# Patient Record
Sex: FEMALE | Race: BLACK OR AFRICAN AMERICAN | Employment: FULL TIME | URBAN - METROPOLITAN AREA
[De-identification: names, ages, dates, MRNs, and addresses within clinical notes are randomized per-mention and may not be internally consistent; named-entity substitution may affect disease eponyms.]

---

## 2020-01-05 ENCOUNTER — TRANSFERRED RECORDS (OUTPATIENT)
Dept: HEALTH INFORMATION MANAGEMENT | Facility: CLINIC | Age: 35
End: 2020-01-05

## 2020-08-26 ENCOUNTER — PRE VISIT (OUTPATIENT)
Dept: NEUROLOGY | Facility: CLINIC | Age: 35
End: 2020-08-26

## 2020-08-26 NOTE — TELEPHONE ENCOUNTER
FUTURE VISIT INFORMATION      FUTURE VISIT INFORMATION:    Date: 8/31/2020    Time: 810am    Location: [facility]  REFERRAL INFORMATION:    Referring provider:  Self     Referring providers clinic:      Reason for visit/diagnosis  Headaches     RECORDS REQUESTED FROM:       Clinic name Comments Records Status Imaging Status   [facility] CTA Head 1/5/2020 Requested  Requested                                    -8/31/2020-[facility] records scanned to Media tab, 2nd request for CTA Head 1/5/2020 faxed to [facility] Radiology-MR @ 621am

## 2020-08-30 ASSESSMENT — ENCOUNTER SYMPTOMS
DIZZINESS: 0
TROUBLE SWALLOWING: 1
PANIC: 0
DEPRESSION: 1
LEG PAIN: 0
DISTURBANCES IN COORDINATION: 0
HOARSE VOICE: 0
SINUS PAIN: 1
INSOMNIA: 1
JOINT SWELLING: 0
DECREASED CONCENTRATION: 1
ORTHOPNEA: 0
WEAKNESS: 0
STIFFNESS: 0
SMELL DISTURBANCE: 0
SEIZURES: 0
LIGHT-HEADEDNESS: 0
MEMORY LOSS: 0
MUSCLE WEAKNESS: 0
NERVOUS/ANXIOUS: 1
TREMORS: 0
ARTHRALGIAS: 0
SORE THROAT: 1
HYPERTENSION: 0
SINUS CONGESTION: 0
SYNCOPE: 0
NECK PAIN: 1
NECK MASS: 0
BACK PAIN: 1
NUMBNESS: 0
SLEEP DISTURBANCES DUE TO BREATHING: 0
TINGLING: 0
SPEECH CHANGE: 0
HEADACHES: 1
PARALYSIS: 0
LOSS OF CONSCIOUSNESS: 0
TASTE DISTURBANCE: 0
MYALGIAS: 0
PALPITATIONS: 0
EXERCISE INTOLERANCE: 0
HYPOTENSION: 0
MUSCLE CRAMPS: 0

## 2020-08-30 ASSESSMENT — HEADACHE IMPACT TEST (HIT 6)
WHEN YOU HAVE HEADACHES HOW OFTEN IS THE PAIN SEVERE: VERY OFTEN
WHEN YOU HAVE A HEADACHE HOW OFTEN DO YOU WISH YOU COULD LIE DOWN: VERY OFTEN
HIT6 TOTAL SCORE: 68
HOW OFTEN HAVE YOU FELT TOO TIRED TO WORK BECAUSE OF YOUR HEADACHES: VERY OFTEN
HOW OFTEN DID HEADACHS LIMIT CONCENTRATION ON WORK OR DAILY ACTIVITY: VERY OFTEN
HOW OFTEN HAVE YOU FELT FED UP OR IRRITATED BECAUSE OF YOUR HEADACHES: ALWAYS
HOW OFTEN DO HEADACHES LIMIT YOUR DAILY ACTIVITIES: VERY OFTEN

## 2020-08-31 ENCOUNTER — OFFICE VISIT (OUTPATIENT)
Dept: NEUROLOGY | Facility: CLINIC | Age: 35
End: 2020-08-31
Payer: COMMERCIAL

## 2020-08-31 VITALS
HEART RATE: 89 BPM | RESPIRATION RATE: 16 BRPM | OXYGEN SATURATION: 98 % | SYSTOLIC BLOOD PRESSURE: 119 MMHG | DIASTOLIC BLOOD PRESSURE: 65 MMHG

## 2020-08-31 DIAGNOSIS — Z82.49 FAMILY HISTORY OF CEREBRAL ANEURYSM: ICD-10-CM

## 2020-08-31 DIAGNOSIS — R51.9 HEADACHE DISORDER: Primary | ICD-10-CM

## 2020-08-31 DIAGNOSIS — R20.8 DYSESTHESIA: ICD-10-CM

## 2020-08-31 RX ORDER — FLUCONAZOLE 150 MG/1
TABLET ORAL
COMMUNITY
Start: 2020-02-12

## 2020-08-31 RX ORDER — DIMENHYDRINATE 50 MG
TABLET ORAL
COMMUNITY
Start: 2013-03-01

## 2020-08-31 RX ORDER — TRAMADOL HYDROCHLORIDE 50 MG/1
50 TABLET ORAL
COMMUNITY
Start: 2018-03-02

## 2020-08-31 RX ORDER — CETIRIZINE HYDROCHLORIDE 10 MG/1
TABLET ORAL
COMMUNITY

## 2020-08-31 RX ORDER — ONDANSETRON 4 MG/1
TABLET, FILM COATED ORAL
COMMUNITY
Start: 2018-03-01

## 2020-08-31 RX ORDER — FLUTICASONE PROPIONATE 50 MCG
SPRAY, SUSPENSION (ML) NASAL
COMMUNITY

## 2020-08-31 ASSESSMENT — PAIN SCALES - GENERAL: PAINLEVEL: MODERATE PAIN (4)

## 2020-08-31 NOTE — PATIENT INSTRUCTIONS
Plan:  Brain MRI with and without contrast  Brain MRA -family history of ruptured cerebral aneurysm  Follow up via video to review images and to discuss headache treatment.
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
## 2020-08-31 NOTE — LETTER
Date:September 17, 2020      Patient was self referred, no letter generated. Do not send.        [facility] Information
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
## 2020-09-22 ENCOUNTER — ANCILLARY PROCEDURE (OUTPATIENT)
Dept: MRI IMAGING | Facility: CLINIC | Age: 35
End: 2020-09-22
Attending: NURSE PRACTITIONER
Payer: COMMERCIAL

## 2020-09-22 DIAGNOSIS — R20.8 DYSESTHESIA: ICD-10-CM

## 2020-09-22 DIAGNOSIS — R51.9 HEADACHE DISORDER: ICD-10-CM

## 2020-09-22 DIAGNOSIS — Z82.49 FAMILY HISTORY OF CEREBRAL ANEURYSM: ICD-10-CM

## 2020-09-22 ASSESSMENT — HEADACHE IMPACT TEST (HIT 6)
WHEN YOU HAVE A HEADACHE HOW OFTEN DO YOU WISH YOU COULD LIE DOWN: VERY OFTEN
HIT6 TOTAL SCORE: 66
HOW OFTEN HAVE YOU FELT TOO TIRED TO WORK BECAUSE OF YOUR HEADACHES: VERY OFTEN
HOW OFTEN DID HEADACHS LIMIT CONCENTRATION ON WORK OR DAILY ACTIVITY: VERY OFTEN
HOW OFTEN HAVE YOU FELT FED UP OR IRRITATED BECAUSE OF YOUR HEADACHES: VERY OFTEN
WHEN YOU HAVE HEADACHES HOW OFTEN IS THE PAIN SEVERE: VERY OFTEN
HOW OFTEN DO HEADACHES LIMIT YOUR DAILY ACTIVITIES: VERY OFTEN

## 2020-09-23 ENCOUNTER — OFFICE VISIT (OUTPATIENT)
Dept: NEUROLOGY | Facility: CLINIC | Age: 35
End: 2020-09-23
Payer: COMMERCIAL

## 2020-09-23 VITALS
SYSTOLIC BLOOD PRESSURE: 132 MMHG | HEART RATE: 85 BPM | OXYGEN SATURATION: 96 % | DIASTOLIC BLOOD PRESSURE: 94 MMHG | RESPIRATION RATE: 16 BRPM

## 2020-09-23 DIAGNOSIS — G43.111 INTRACTABLE MIGRAINE WITH AURA WITH STATUS MIGRAINOSUS: Primary | ICD-10-CM

## 2020-09-23 DIAGNOSIS — R20.8 DYSESTHESIA OF SCALP: ICD-10-CM

## 2020-09-23 DIAGNOSIS — Z82.49 FAMILY HISTORY OF CEREBRAL ANEURYSM: ICD-10-CM

## 2020-09-23 ASSESSMENT — PAIN SCALES - GENERAL: PAINLEVEL: NO PAIN (0)

## 2020-09-23 NOTE — RESULT ENCOUNTER NOTE
Filiberto Vargas,   Your recent brain MRI results reviewed and no concerning findings at this time. Follow up as discussed.   Stay safe,   Irma

## 2020-09-23 NOTE — NURSING NOTE
Chief Complaint   Patient presents with     Follow Up     [facility] RETURN HEADACHE        Terrence Rose

## 2020-09-23 NOTE — PROGRESS NOTES
Re: Sam Schofield      MRN# [id number]  YOB: 1985  Date of Visit:2020    OUTPATIENT NEUROLOGY VISIT NOTE    Reason for Visit:  Headache follow-up    Interval History:  Sam Schofield is a 35-year-old female presents to the [facility] headache clinic today for headache follow-up  Initial neurology/headache clinic visit on 2020, see note for headache history details.  Today patient reports headaches are still frequent. Mother has migraine headaches.  Grandmother  from cerebral aneurysm rupture.  Brain MRI and MRA without IV contrast reviewed and normal brain MRI and head MRA demonstrated no defined aneurysmal stenosis of the major intracranial arteries.  Images were reviewed with the patient.  Patient reports burning sensation on the top of her head happened twice and have not felt like that since late August.  Patient reports that she is concerned abo burning sensation.   Headache frequency feels like every day and wakes up with a migraine headache and feels like every day. Reports that caffeine beverage help but gives anxious feeling. Reports that headaches can intensify and reports that when headache starts to throb that she cannot walk -takes excedrin. Reports that she tried not to take Excedrin.   Associated -light and sound sensitivity, nausea, no vomiting. Headache worse with movements/activity. Pain can be on either side.     Migraine headaches and possible overlap with occipital neuralgia which may explain the burning sensation.   Headache treatment Plan discussed with the patient:  Headache log for frequency, duration, severity, acute analgesics use  Stay hydrated  Stay active   Keep sleep routine   Caffeine intake -limit use to no more than 1-2 cups per day   Frequent away from screen breaks  Headache prevention  Vitamin B2 200-400 mg daily OTC -may causes GI symptoms, urine discoloration  Acupuncture  Would recommend A trial of Migraine headache prevention-a trial of topiramate  25 mg at bedtime for one week, then 50 mg at bedtime for one week, then take 75 mg (3 tabs) at bedtime. Side effects-teratogenic effect if pregnant and need to use reliable birth control and back up birth control, may decrease efficacy of birth control pills, stay hydrated and drink at least 10+glasses of water to decrease risk of kidney stones, may cause tingling in the hands and feet, taste changes, glaucoma, nausea, weight stable or loss, mood changes, depression  Alternatively we could try other treatment options with commonly used medications such as propranolol or amitriptyline in small doses with a gradual increase per tolerance and response   Cefaly device -dual action -headache prevention and acute headache treatment   Acute migraine headache treatment -a trial of sumatriptan as needed for acute migraine treatment. Limit use to no more than 9 days per month.   May consider antinausea medication-ondasetron or prochlorperazine or promethazine.   Recommended a complete eye exam   Follow up in 1-2 months or sooner if needed     Burning sensation we could try an occipital nerve block if no relief with attempted migraine headache preventive treatment.     Headache possible etiology is with the patient of the patient's questions were answered from the best of my knowledge.  Reassurance given especially with headache normal neurological exam and normal images results.  Patient would like to wait with any recommended treatment and think about suggested plan.  She will get back to us once she decides what she would like to do.  Patient is more interested in nonpharmacological approaches which I respect and options given.  And options were given to the patient  No new medications were prescribed today    Past Medical History reviewed   Social History     Tobacco Use     Smoking status: Not on file   Substance Use Topics     Alcohol use: Not on file    reviewed and verified with the patient   No Known  Allergies    Current Outpatient Medications   Medication Sig Dispense Refill     aspirin-acetaminophen-caffeine (EXCEDRIN MIGRAINE) 250-250-65 MG tablet        cetirizine (ZYRTEC) 10 MG tablet        dimenhyDRINATE (DRAMAMINE) 50 MG tablet        fluconazole (DIFLUCAN) 150 MG tablet        fluconazole (DIFLUCAN) 150 MG tablet Take one tablet by mouth now. And repeat in 3 days if symptoms persist, #2 tablets       fluticasone (FLONASE) 50 MCG/ACT nasal spray        ondansetron (ZOFRAN) 4 MG tablet        traMADol (ULTRAM) 50 MG tablet Take 50 mg by mouth     reviewed and verified with the patient    Review of Systems:   A 10-point ROS including constitutional, eyes, respiratory, cardiovascular, gastroenterology, genitourinary, integumentary, musculoskeletal, neurology and psychiatric were all negative except as mentioned in the interval history.   Answers for HPI/ROS submitted by the patient on 9/22/2020   General Symptoms: No  Skin Symptoms: No  HENT Symptoms: No  EYE SYMPTOMS: No  HEART SYMPTOMS: No  LUNG SYMPTOMS: No  INTESTINAL SYMPTOMS: No  URINARY SYMPTOMS: No  GYNECOLOGIC SYMPTOMS: No  BREAST SYMPTOMS: No  SKELETAL SYMPTOMS: No  BLOOD SYMPTOMS: No  NERVOUS SYSTEM SYMPTOMS: No  MENTAL HEALTH SYMPTOMS: No     General Exam:   BP (!) 132/94   Pulse 85   Resp 16   SpO2 96%   GEN: Awake, NAD; good eye contact, responses appropriately   GEN: Awake, NAD; good eye contact, responses appropriately , healthy apearingSpeech is fluent without dysarthria.NEURO: Cranial nerves grossly intact.  Mentation and speech appropriate for age.PSYCH: Mentation appears normal, affect normal/bright, judgement and insight intact, normal speech and appearance well-groomed.    HEENT: Head atraumatic/Normocephalic. Scalp normal. Pupils equally round, 4 mm, reactive to light and accommodation, sclera and conjunctiva normal. Fundoscopic examination reveals normal vessels no papilledema.   Neck: Easily moveable without resistance  Heart:  S1/S2 appreciated, RRR, no m/r/g, no carotid bruits  Lungs:Lungs are clear to auscultation bilaterally, no wheezes or crackles.   Neurological Examination:  The patient is alert and oriented times four. Has good attention and concentration. Speech is fluent without dysarthria. EOM intact. There is no nystagmus. Has conjugated gaze. Face is symmetrical. Intact and symmetrical eyebrow and lid raise and eyelid closure, smiles. Hearing Intact to conversation speech. The palates elevates symmetrical. The tongue protrudes midline with no atrophy or fasciculations. Strength  5/5 in the upper and lower extremities bilaterally. Sensation is intact to  touch throughout.  Reflexes symmetrical at biceps, triceps, brachioradialis, patellar, and Achilles. Negative Babinski with downgoing toes bilaterally. Coordination reveals finger-nose-finger, rapid alternating movements with normal speed and accuracy. Normal casual gait.    Assessment and Plan:  See interval history for our discussion and plan    Prescriptions provided. Correct use and course provided. Expected benefits and typical side effects reviewed. Safety of concomitant medications and interactions reviewed. Patient taught signs and symptoms of adverse reactions and allergies. Patient understands teaching and accepts risks of prescribed medication regimen.    I discussed all my recommendation with Sam Schofield. The patient verbalizes understanding and comfortable with the plan. The patient has our clinic phone number to call with any questions or concerns. All of the patient's questions were answered from the best of my current knowledge.     Time spent with pt answering questions, discussing findings, counseling and coordinating care was more than 50% the appointment time, 40  minutes.         DMITRY Meade, CNP  [facility]

## 2020-09-23 NOTE — PATIENT INSTRUCTIONS
Plan:  Headache log for frequency, duration, severity, acute analgesics use  Stay hydrated  Stay active   Keep sleep routine   Caffeine intake -limit use to no more than 1-2 cups per day   Frequent away from screen breaks  Headache prevention  Vitamin B2 200-400 mg daily OTC -may causes GI symptoms, urine discoloration  Acupuncture  Would recommend A trial of Migraine headache prevention-a trial of topiramate 25 mg at bedtime for one week, then 50 mg at bedtime for one week, then take 75 mg (3 tabs) at bedtime. Side effects-teratogenic effect if pregnant and need to use reliable birth control and back up birth control, may decrease efficacy of birth control pills, stay hydrated and drink at least 10+glasses of water to decrease risk of kidney stones, may cause tingling in the hands and feet, taste changes, glaucoma, nausea, weight stable or loss, mood changes, depression  Alternatively we could try other treatment options with commonly used medications such as propranolol or amitriptyline in small doses with a gradual increase per tolerance and response   Cefaly device -dual action -headache prevention and acute headache treatment   Acute migraine headache treatment -a trial of sumatriptan as needed for acute migraine treatment. Limit use to no more than 9 days per month.   May consider antinausea medication-ondasetron or prochlorperazine or promethazine.   Recommended a complete eye exam   Follow up in 1-2 months or sooner if needed

## 2020-09-23 NOTE — LETTER
Date:September 30, 2020      Patient was self referred, no letter generated. Do not send.        [facility] Information

## 2020-09-23 NOTE — LETTER
2020       RE: Sam Schofield  [address]  [facility] [address]     Dear Colleague,    Thank you for referring your patient, Sam Schofield, to the [facility] at [facility]. Please see a copy of my visit note below.    Re: Sam Schofield      MRN# [id number]  YOB: 1985  Date of Visit:2020    OUTPATIENT NEUROLOGY VISIT NOTE    Reason for Visit:  Headache follow-up    Interval History:  Sam Schofield is a 35-year-old female presents to the [facility] headache clinic today for headache follow-up  Initial neurology/headache clinic visit on 2020, see note for headache history details.  Today patient reports headaches are still frequent. Mother has migraine headaches.  Grandmother  from cerebral aneurysm rupture.  Brain MRI and MRA without IV contrast reviewed and normal brain MRI and head MRA demonstrated no defined aneurysmal stenosis of the major intracranial arteries.  Images were reviewed with the patient.  Patient reports burning sensation on the top of her head happened twice and have not felt like that since late August.  Patient reports that she is concerned abo burning sensation.   Headache frequency feels like every day and wakes up with a migraine headache and feels like every day. Reports that caffeine beverage help but gives anxious feeling. Reports that headaches can intensify and reports that when headache starts to throb that she cannot walk -takes excedrin. Reports that she tried not to take Excedrin.   Associated -light and sound sensitivity, nausea, no vomiting. Headache worse with movements/activity. Pain can be on either side.     Migraine headaches and possible overlap with occipital neuralgia which may explain the burning sensation.   Headache treatment Plan discussed with the patient:  Headache log for frequency, duration, severity, acute analgesics use  Stay hydrated  Stay active   Keep sleep routine    Caffeine intake -limit use to no more than 1-2 cups per day   Frequent away from screen breaks  Headache prevention  Vitamin B2 200-400 mg daily OTC -may causes GI symptoms, urine discoloration  Acupuncture  Would recommend A trial of Migraine headache prevention-a trial of topiramate 25 mg at bedtime for one week, then 50 mg at bedtime for one week, then take 75 mg (3 tabs) at bedtime. Side effects-teratogenic effect if pregnant and need to use reliable birth control and back up birth control, may decrease efficacy of birth control pills, stay hydrated and drink at least 10+glasses of water to decrease risk of kidney stones, may cause tingling in the hands and feet, taste changes, glaucoma, nausea, weight stable or loss, mood changes, depression  Alternatively we could try other treatment options with commonly used medications such as propranolol or amitriptyline in small doses with a gradual increase per tolerance and response   Cefaly device -dual action -headache prevention and acute headache treatment   Acute migraine headache treatment -a trial of sumatriptan as needed for acute migraine treatment. Limit use to no more than 9 days per month.   May consider antinausea medication-ondasetron or prochlorperazine or promethazine.   Recommended a complete eye exam   Follow up in 1-2 months or sooner if needed     Burning sensation we could try an occipital nerve block if no relief with attempted migraine headache preventive treatment.     Headache possible etiology is with the patient of the patient's questions were answered from the best of my knowledge.  Reassurance given especially with headache normal neurological exam and normal images results.  Patient would like to wait with any recommended treatment and think about suggested plan.  She will get back to us once she decides what she would like to do.  Patient is more interested in nonpharmacological approaches which I respect and options given.  And options  were given to the patient  No new medications were prescribed today    Past Medical History reviewed   Social History     Tobacco Use     Smoking status: Not on file   Substance Use Topics     Alcohol use: Not on file    reviewed and verified with the patient   No Known Allergies    Current Outpatient Medications   Medication Sig Dispense Refill     aspirin-acetaminophen-caffeine (EXCEDRIN MIGRAINE) 250-250-65 MG tablet        cetirizine (ZYRTEC) 10 MG tablet        dimenhyDRINATE (DRAMAMINE) 50 MG tablet        fluconazole (DIFLUCAN) 150 MG tablet        fluconazole (DIFLUCAN) 150 MG tablet Take one tablet by mouth now. And repeat in 3 days if symptoms persist, #2 tablets       fluticasone (FLONASE) 50 MCG/ACT nasal spray        ondansetron (ZOFRAN) 4 MG tablet        traMADol (ULTRAM) 50 MG tablet Take 50 mg by mouth     reviewed and verified with the patient    Review of Systems:   A 10-point ROS including constitutional, eyes, respiratory, cardiovascular, gastroenterology, genitourinary, integumentary, musculoskeletal, neurology and psychiatric were all negative except as mentioned in the interval history.   Answers for HPI/ROS submitted by the patient on 9/22/2020   General Symptoms: No  Skin Symptoms: No  HENT Symptoms: No  EYE SYMPTOMS: No  HEART SYMPTOMS: No  LUNG SYMPTOMS: No  INTESTINAL SYMPTOMS: No  URINARY SYMPTOMS: No  GYNECOLOGIC SYMPTOMS: No  BREAST SYMPTOMS: No  SKELETAL SYMPTOMS: No  BLOOD SYMPTOMS: No  NERVOUS SYSTEM SYMPTOMS: No  MENTAL HEALTH SYMPTOMS: No     General Exam:   BP (!) 132/94   Pulse 85   Resp 16   SpO2 96%   GEN: Awake, NAD; good eye contact, responses appropriately   GEN: Awake, NAD; good eye contact, responses appropriately , healthy apearingSpeech is fluent without dysarthria.NEURO: Cranial nerves grossly intact.  Mentation and speech appropriate for age.PSYCH: Mentation appears normal, affect normal/bright, judgement and insight intact, normal speech and appearance  well-groomed.    HEENT: Head atraumatic/Normocephalic. Scalp normal. Pupils equally round, 4 mm, reactive to light and accommodation, sclera and conjunctiva normal. Fundoscopic examination reveals normal vessels no papilledema.   Neck: Easily moveable without resistance  Heart: S1/S2 appreciated, RRR, no m/r/g, no carotid bruits  Lungs:Lungs are clear to auscultation bilaterally, no wheezes or crackles.   Neurological Examination:  The patient is alert and oriented times four. Has good attention and concentration. Speech is fluent without dysarthria. EOM intact. There is no nystagmus. Has conjugated gaze. Face is symmetrical. Intact and symmetrical eyebrow and lid raise and eyelid closure, smiles. Hearing Intact to conversation speech. The palates elevates symmetrical. The tongue protrudes midline with no atrophy or fasciculations. Strength  5/5 in the upper and lower extremities bilaterally. Sensation is intact to  touch throughout.  Reflexes symmetrical at biceps, triceps, brachioradialis, patellar, and Achilles. Negative Babinski with downgoing toes bilaterally. Coordination reveals finger-nose-finger, rapid alternating movements with normal speed and accuracy. Normal casual gait.    Assessment and Plan:  See interval history for our discussion and plan    Prescriptions provided. Correct use and course provided. Expected benefits and typical side effects reviewed. Safety of concomitant medications and interactions reviewed. Patient taught signs and symptoms of adverse reactions and allergies. Patient understands teaching and accepts risks of prescribed medication regimen.    I discussed all my recommendation with Sam Schofield. The patient verbalizes understanding and comfortable with the plan. The patient has our clinic phone number to call with any questions or concerns. All of the patient's questions were answered from the best of my current knowledge.     Time spent with pt answering questions, discussing  findings, counseling and coordinating care was more than 50% the appointment time, 40  minutes.         DMITRY Meade, CNP  [facility]        Again, thank you for allowing me to participate in the care of your patient.      Sincerely,    DMITRY Dacosta CNP

## 2020-11-29 ENCOUNTER — HEALTH MAINTENANCE LETTER (OUTPATIENT)
Age: 35
End: 2020-11-29

## 2021-09-19 ENCOUNTER — HEALTH MAINTENANCE LETTER (OUTPATIENT)
Age: 36
End: 2021-09-19

## 2022-01-09 ENCOUNTER — HEALTH MAINTENANCE LETTER (OUTPATIENT)
Age: 37
End: 2022-01-09

## 2022-05-17 ENCOUNTER — TRANSCRIBE ORDERS (OUTPATIENT)
Dept: OTHER | Age: 37
End: 2022-05-17

## 2022-05-17 DIAGNOSIS — Z09 HOSPITAL DISCHARGE FOLLOW-UP: ICD-10-CM

## 2022-05-17 DIAGNOSIS — F43.9 SITUATIONAL STRESS: ICD-10-CM

## 2022-05-17 DIAGNOSIS — R07.9 CHEST PAIN, UNSPECIFIED TYPE: Primary | ICD-10-CM

## 2022-06-21 ENCOUNTER — TRANSCRIBE ORDERS (OUTPATIENT)
Dept: OTHER | Age: 37
End: 2022-06-21

## 2022-06-21 DIAGNOSIS — M54.50 ACUTE RIGHT-SIDED LOW BACK PAIN WITHOUT SCIATICA: Primary | ICD-10-CM

## 2022-11-21 ENCOUNTER — HEALTH MAINTENANCE LETTER (OUTPATIENT)
Age: 37
End: 2022-11-21

## 2023-04-16 ENCOUNTER — HEALTH MAINTENANCE LETTER (OUTPATIENT)
Age: 38
End: 2023-04-16